# Patient Record
Sex: MALE | Race: WHITE
[De-identification: names, ages, dates, MRNs, and addresses within clinical notes are randomized per-mention and may not be internally consistent; named-entity substitution may affect disease eponyms.]

---

## 2017-12-31 ENCOUNTER — HOSPITAL ENCOUNTER (EMERGENCY)
Dept: HOSPITAL 25 - UCCORT | Age: 47
Discharge: HOME | End: 2017-12-31
Payer: COMMERCIAL

## 2017-12-31 VITALS — SYSTOLIC BLOOD PRESSURE: 120 MMHG | DIASTOLIC BLOOD PRESSURE: 61 MMHG

## 2017-12-31 DIAGNOSIS — J20.9: Primary | ICD-10-CM

## 2017-12-31 PROCEDURE — 99213 OFFICE O/P EST LOW 20 MIN: CPT

## 2017-12-31 PROCEDURE — G0463 HOSPITAL OUTPT CLINIC VISIT: HCPCS

## 2017-12-31 NOTE — UC
Respiratory Complaint HPI





- HPI Summary


HPI Summary: 





46 yo male with 3 day hx of cough


productive at times


no HA or myalgias


no sob


no cp





- History of Current Complaint


Chief Complaint: UCRespiratory


Stated Complaint: COUGH


Time Seen by Provider: 12/31/17 09:20


Hx Obtained From: Patient


Onset/Duration: Sudden Onset


Timing: Constant


Severity Initially: Moderate


Severity Currently: Moderate


Pain Intensity: 2


Pain Scale Used: 0-10 Numeric


Character: Cough: Nonproductive


Aggravating Factors: Nothing


Alleviating Factors: Nothing


Associated Signs And Symptoms: Positive: Nasal Congestion - slight





- Allergies/Home Medications


Allergies/Adverse Reactions: 


 Allergies











Allergy/AdvReac Type Severity Reaction Status Date / Time


 


No Known Allergies Allergy   Verified 12/31/17 08:42











Home Medications: 


 Home Medications





Doxylamine-Dm [Nite Time Cough] 1 liq PO ONCE PRN 12/31/17 [History Confirmed 12 /31/17]











PMH/Surg Hx/FS Hx/Imm Hx


Previously Healthy: Yes - Lyme Disease


Endocrine History: Thyroid Disease - GRAVES





- Surgical History


Surgical History: None





- Family History


Known Family History: Positive: Diabetes





- Social History


Alcohol Use: None


Substance Use Type: None


Smoking Status (MU): Never Smoked Tobacco





- Immunization History


Most Recent Influenza Vaccination: not this season





Review of Systems


Constitutional: Negative


Skin: Negative


Eyes: Negative


ENT: Negative


Respiratory: Cough


Cardiovascular: Negative


Gastrointestinal: Negative


Genitourinary: Negative


Motor: Negative


Neurovascular: Negative


Musculoskeletal: Negative


Neurological: Negative


Psychological: Negative


Is Patient Immunocompromised?: No


All Other Systems Reviewed And Are Negative: Yes





Physical Exam


Triage Information Reviewed: Yes


Appearance: Well-Appearing, No Pain Distress, Well-Nourished


Vital Signs: 


 Initial Vital Signs











Temp  98.9 F   12/31/17 08:44


 


Pulse  79   12/31/17 08:44


 


Resp  18   12/31/17 08:44


 


BP  120/61   12/31/17 08:44


 


Pulse Ox  99   12/31/17 08:44











Eyes: Positive: Conjunctiva Clear


ENT: Positive: Hearing grossly normal, Uvula midline.  Negative: Pharyngeal 

erythema, Nasal congestion, Nasal drainage, Sinus tenderness


Neck: Positive: Supple, Nontender, No Lymphadenopathy


Respiratory: Positive: No respiratory distress, No accessory muscle use, 

Wheezing - with forced expiration


Cardiovascular: Positive: RRR, No Murmur


Musculoskeletal: Positive: ROM Intact, No Edema


Neurological: Positive: Alert


Psychological Exam: Normal


Skin Exam: Normal





UC Diagnostic Evaluation





- Laboratory


O2 Sat by Pulse Oximetry: 99 - normal/not hypoxic





Respiratory Course/Dx





- Differential Dx/Diagnosis


Provider Diagnoses: acute bronchitis with bronchospasm





Discharge





- Discharge Plan


Condition: Stable


Disposition: HOME


Prescriptions: 


Amoxicillin PO (*) [Amoxicillin 875 MG (*)] 875 mg PO BID #14 tab


Prednisone [Deltasone] 40 mg PO DAILY #10 tab


Patient Education Materials:  Acute Bronchitis (ED), Bronchospasm (ED)


Referrals: 


Donald Diaz MD [Primary Care Provider] - 4 Days (if not better)


Additional Instructions: 


rest


fluids


tylenol or advil if needed





use inhaler as directed





recheck for worsening or new symptoms





robitussin or mucinex

## 2019-12-16 ENCOUNTER — HOSPITAL ENCOUNTER (EMERGENCY)
Dept: HOSPITAL 25 - UCCORT | Age: 49
Discharge: HOME | End: 2019-12-16
Payer: COMMERCIAL

## 2019-12-16 VITALS — DIASTOLIC BLOOD PRESSURE: 79 MMHG | SYSTOLIC BLOOD PRESSURE: 114 MMHG

## 2019-12-16 DIAGNOSIS — R09.81: ICD-10-CM

## 2019-12-16 DIAGNOSIS — J02.9: ICD-10-CM

## 2019-12-16 DIAGNOSIS — H66.92: Primary | ICD-10-CM

## 2019-12-16 PROCEDURE — 99212 OFFICE O/P EST SF 10 MIN: CPT

## 2019-12-16 PROCEDURE — G0463 HOSPITAL OUTPT CLINIC VISIT: HCPCS

## 2019-12-16 NOTE — UC
Throat Pain/Nasal Rosas HPI





- HPI Summary


HPI Summary: 





49-year-old male who has had head congestion and cough over the past 3 weeks.  

He states now he has postnasal drainage and cough some green sputum.  He states 

a few nights ago he had an intermittent fever.





- History of Current Complaint


Chief Complaint: UCGeneralIllness


Stated Complaint: COUGH


Time Seen by Provider: 12/16/19 07:55


Hx Obtained From: Patient


Onset/Duration: Gradual Onset


Severity: Mild


Pain Intensity: 0


Cough: Productive - Productive cough of greenish sputum.


Associated Signs & Symptoms: Positive: Nasal Discharge





- Allergies/Home Medications


Allergies/Adverse Reactions: 


 Allergies











Allergy/AdvReac Type Severity Reaction Status Date / Time


 


No Known Allergies Allergy   Verified 12/16/19 07:28











Home Medications: 


 Home Medications





guaiFENesin [Mucinex] 1 dose PO ONCE 12/16/19 [History Confirmed 12/16/19]











PMH/Surg Hx/FS Hx/Imm Hx





- Additional Past Medical History


Additional PMH: 





Hx of Lyme Disease


Previously Healthy: Yes


Endocrine History: Thyroid Disease





- Surgical History


Surgical History: None





- Family History


Known Family History: Positive: Diabetes





- Social History


Lives: With Family


Alcohol Use: None


Substance Use Type: None


Smoking Status (MU): Never Smoked Tobacco





- Immunization History


Most Recent Influenza Vaccination: not this season





Review of Systems


All Other Systems Reviewed And Are Negative: Yes


Constitutional: Positive: Fever - Patient states a few nights ago for 2 or 3 

nitroglycerin he awakened with what he thought was a fever because he was 

drenched in sweat.


ENT: Positive: Sore Throat, Nasal Discharge, Sinus Congestion


Respiratory: Positive: Cough


Is Patient Immunocompromised?: No





Physical Exam


Triage Information Reviewed: Yes


Appearance: Well-Appearing, No Pain Distress, Well-Nourished


Vital Signs: 


 Initial Vital Signs











Temp  97.9 F   12/16/19 07:29


 


Pulse  59   12/16/19 07:29


 


Resp  17   12/16/19 07:29


 


BP  114/79   12/16/19 07:29


 


Pulse Ox  100   12/16/19 07:29











Vital Signs Reviewed: Yes


Eyes: Positive: Conjunctiva Clear


ENT: Positive: Pharynx normal, Nasal congestion, Nasal drainage - Yellow nasal 

coryza., TM red - Right tympanic membrane with good landmarks and light reflex, 

pearly gray.  Left tympanic membrane is erythematous with poor landmarks and 

light reflex., Uvula midline.  Negative: Tonsillar swelling, Tonsillar exudate, 

Trismus, Muffled voice, Hoarse voice


Neck: Positive: Supple, Nontender, No Lymphadenopathy


Respiratory: Positive: Lungs clear, Normal breath sounds, No respiratory 

distress, No accessory muscle use


Cardiovascular: Positive: RRR, No Murmur, Pulses Normal, Brisk Capillary Refill


Musculoskeletal Exam: Normal


Neurological Exam: Normal


Psychological Exam: Normal


Skin Exam: Normal





Throat Pain/Nasal Course/Dx





- Course


Course Of Treatment: 





Patient is comfortable here.  I'm going to treated with Augmentin for the ear 

infection which will also cover the sinuses.





- Differential Dx/Diagnosis


Provider Diagnosis: 


 Left otitis media








Discharge ED





- Sign-Out/Discharge


Documenting (check all that apply): Patient Departure


All imaging exams completed and their final reports reviewed: No Studies





- Discharge Plan


Condition: Good


Disposition: HOME


Prescriptions: 


Amoxicillin/Clavulanate TAB* [Augmentin *] 875 mg PO BID 10 Days #20 tab


Patient Education Materials:  Ear Infection (ED)


Referrals: 


Sean Bailey MD [Primary Care Provider] - 


Additional Instructions: 


Increase Fluids, take the Augmentin with food.  Follow-up with your primary 

care provider if no improvement in 4 or 5 days.





- Billing Disposition and Condition


Condition: GOOD


Disposition: Home